# Patient Record
Sex: FEMALE | Race: WHITE
[De-identification: names, ages, dates, MRNs, and addresses within clinical notes are randomized per-mention and may not be internally consistent; named-entity substitution may affect disease eponyms.]

---

## 2018-05-31 ENCOUNTER — HOSPITAL ENCOUNTER (OUTPATIENT)
Dept: HOSPITAL 47 - FBPOP | Age: 28
Discharge: HOME | End: 2018-05-31
Payer: COMMERCIAL

## 2018-05-31 VITALS
HEART RATE: 78 BPM | RESPIRATION RATE: 16 BRPM | DIASTOLIC BLOOD PRESSURE: 65 MMHG | SYSTOLIC BLOOD PRESSURE: 114 MMHG | TEMPERATURE: 98.1 F

## 2018-05-31 DIAGNOSIS — Z3A.24: ICD-10-CM

## 2018-05-31 DIAGNOSIS — O47.02: Primary | ICD-10-CM

## 2018-05-31 PROCEDURE — 99213 OFFICE O/P EST LOW 20 MIN: CPT

## 2018-05-31 PROCEDURE — 82731 ASSAY OF FETAL FIBRONECTIN: CPT

## 2018-06-11 NOTE — P.MSEPDOC
Presenting Problems





- Arrival Data


Date of Arrival on Unit: 18


Time of Arrival on Unit: 21:03


Mode of Transport: Wheelchair





- Complaint


OB-Reason for Admission/Chief Complaint: Possible Onset of Labor


Comment: Patient states she has a history of at vaginal delivery at 27 weeks 

with her last baby, states she has been having marisabel chen contractions for a 

few weeks now, but that today she noticed they were increased in frequency, 

states she was nervous because of her last delivery so just wanted to come in 

for peace of mind.





Prenatal Medical History





- Pregnancy Information


: 2


Para: 1


Term: 0


: 1


Abortions: Spontaneous or Elective: 0


Number of Living Children: 1





- Gestational Age


Gestational Age by JOHN (wks/days): 24 Weeks and 3 Days





- Prenatal History


Pregnancy Complications: Prior 





Review of Systems





- Review of Systems


Constitutional: No problems


Breast: No problems


ENT: No problems


Cardiovascular: No problems


Respiratory: No problems


Gastrointestinal: No problems


Genitourinary: No problems


Musculoskeletal: No problems


Neurological: No problems


Skin: No problems





Vital Signs





- Temperature


Temperature: 98.1 F


Temperature Source: Temporal Artery Scan





- Pulse


  ** Pulse Oximetery


Pulse Rate: 78


Pulse Assessment Method: Pulse Oximetry





- Respirations


Respiratory Rate: 16


Oxygen Delivery Method: Room Air





- Blood Pressure


  ** Sitting


Blood Pressure: 114/65


Blood Pressure Mean: 81


Blood Pressure Source: Automatic Cuff





Medical Screen Scoring (Pre)





- Cervical Exam


Dilation: 0 cm = 0


Membranes: Intact





- Uterine Contractions


Frequency: > 5 minutes apart = 1


Duration: > 40 seconds = 2


Intensity: N/A





- Maternal Vital Signs


Maternal Temperature: N/A


Maternal Blood Pressure: N/A


Signs of Preeclampsia: N/A


Maternal Respirations: N/A





- Total Score


Total Score (Pre): 3





- Level of Risk


Level of Risk: Low (0-5)





Physician Notification (Pre)





- Physician Notified


Physician Notified Date: 18


Physician Notified Time: 21:15


Physician/Practitioner Notifed:: Dr. Meza


New Order Received: Yes





- Notification Comment


Comment: Collect and send FFN, and check cervix, if ffn is negative and patient 

is not marbin okay to discharge patient home with instructions, if ffn 

postive call physician with report.  Instruct patient to take a stool softener 

to help with increased constipation, but avoid a laxative.





Medical Screen Scoring (Post)





- Cervical Exam


Dilation: 0 cm = 0


Membranes: Intact





- Uterine Contractions


Frequency: < 36 weeks = 6


Duration: > 40 seconds = 2


Intensity: N/A





- Maternal Vital Signs


Maternal Temperature: N/A


Maternal Blood Pressure: N/A


Signs of Preeclampsia: N/A


Maternal Respirations: N/A





- Total Score


Total Score (Post): 8





- Post Treatment Level of Risk


Post Treatment Level of Risk: Medium (6-9)





Physician Notification (Post)





- Physician Notified


Physician Notified Date: 18


Physician Notified Time: 22:50


Physician/Practitioner Notified:: DR MEZA


New Order Received: Yes





Disposition





- Disposition


OB Disposition: Discharge to home, Written follow up instructions reviewed


Discharge Date: 18


Discharge Time: 23:00


I agree with the RN Medical Screening Exam: Yes


Risk & Benefit of care provided described in d/c instruction: Yes


Diagnosis: FALSE LABOR BEFORE 37 COMPLETED WEEKS OF GEST, UNSP TRI

## 2018-08-07 ENCOUNTER — HOSPITAL ENCOUNTER (OUTPATIENT)
Dept: HOSPITAL 47 - FBPOP | Age: 28
Discharge: NURSING, REHAB OR LONG-TERM CARE | End: 2018-08-07
Attending: OBSTETRICS & GYNECOLOGY
Payer: COMMERCIAL

## 2018-08-07 DIAGNOSIS — Z3A.34: ICD-10-CM

## 2018-08-07 DIAGNOSIS — M54.9: ICD-10-CM

## 2018-08-07 DIAGNOSIS — O99.89: Primary | ICD-10-CM

## 2018-08-07 LAB
BASOPHILS # BLD AUTO: 0 K/UL (ref 0–0.2)
BASOPHILS NFR BLD AUTO: 0 %
EOSINOPHIL # BLD AUTO: 0.2 K/UL (ref 0–0.7)
EOSINOPHIL NFR BLD AUTO: 2 %
ERYTHROCYTE [DISTWIDTH] IN BLOOD BY AUTOMATED COUNT: 3.6 M/UL (ref 3.8–5.4)
ERYTHROCYTE [DISTWIDTH] IN BLOOD: 12.3 % (ref 11.5–15.5)
HCT VFR BLD AUTO: 35.9 % (ref 34–46)
HGB BLD-MCNC: 11.9 GM/DL (ref 11.4–16)
LYMPHOCYTES # SPEC AUTO: 2 K/UL (ref 1–4.8)
LYMPHOCYTES NFR SPEC AUTO: 18 %
MCH RBC QN AUTO: 33.2 PG (ref 25–35)
MCHC RBC AUTO-ENTMCNC: 33.2 G/DL (ref 31–37)
MCV RBC AUTO: 99.9 FL (ref 80–100)
MONOCYTES # BLD AUTO: 0.6 K/UL (ref 0–1)
MONOCYTES NFR BLD AUTO: 5 %
NEUTROPHILS # BLD AUTO: 8.5 K/UL (ref 1.3–7.7)
NEUTROPHILS NFR BLD AUTO: 74 %
PLATELET # BLD AUTO: 316 K/UL (ref 150–450)
WBC # BLD AUTO: 11.5 K/UL (ref 3.8–10.6)

## 2018-08-07 PROCEDURE — 99215 OFFICE O/P EST HI 40 MIN: CPT

## 2018-08-07 PROCEDURE — 96367 TX/PROPH/DG ADDL SEQ IV INF: CPT

## 2018-08-07 PROCEDURE — 96361 HYDRATE IV INFUSION ADD-ON: CPT

## 2018-08-07 PROCEDURE — 59025 FETAL NON-STRESS TEST: CPT

## 2018-08-07 PROCEDURE — 96372 THER/PROPH/DIAG INJ SC/IM: CPT

## 2018-08-07 PROCEDURE — 96365 THER/PROPH/DIAG IV INF INIT: CPT

## 2018-08-07 PROCEDURE — 85025 COMPLETE CBC W/AUTO DIFF WBC: CPT

## 2018-08-07 NOTE — P.HPOB
History of Present Illness


H&P Date: 18


Chief Complaint: Back pain





This is a 29-year-old white female  2 para 0101 EDC 2018 at 34-2/7 

weeks' gestation.  Patient presents today with a complaint of back pain which 

started last night.  She denies fluid leakage or vaginal bleeding.  Fetus is 

been active throughout the pregnancy.  The back pain is mild to moderate in 

nature, she denies any obvious uterine contractions.





Past medical history is significant for attention deficit disorder and MTHFR,  

factor V Leiden.





Past surgical history none recorded.





Current medications Adderall XL 20 mg capsules daily, prenatal vitamin daily, 

17 hydroxyprogesterone injections weekly.





ALLERGIES none known.





Family history significant for factor V Leiden disorder and hypertension.





Reproductive history significant for 28 week vaginal delivery male infant, 2 

lbs. 14 oz., rapid delivery noted.





Social history patient is a long-term tobacco smoker, currently one half pack 

per day.  She is single, boyfriend and father of the baby is involved.  She 

denies alcohol or drug use.





Prenatal history is significant for blood type B+, rubella status immune.  

Patient had an abnormal Pap smear noted in pregnancy, high grade KEMAL.  

Colposcopy was performed along with colposcopically directed biopsies, repeat 

colposcopy in the third trimester was stable.  Chlamydia and gonorrhea cultures 

negative, HIV, hepatitis B surface antigen, urine culture all negative.





On exam this is a pleasant white female, she is 5 foot 7 inches, 150 pounds, 

vital signs are stable and she is afebrile.  The general physical exam is 

within normal limits.  The cervix is 4 cm dilated, very posterior, 70% effaced, 

-1 station, vertex presentation, intact.  Fetal heart rate is consistent with 

reactive NST.





Impression: 34-2/7 weeks intrauterine pregnancy,  labor, no cervical 

change noted over the course of 45 minutes.





Plan: Betamethasone has been given.  Magnesium sulfate 4 g loading dose is 

given.  I believe it is in the patient's best interest to transfer to Tertiary 

Care facility, [facility] contacted.  The case has been 

discussed with , maternal-fetal medicine specialist, who is 

accepting transfer.  Cerna catheter will be placed.  Penicillin G will be given 

prior to transfer.  She is aware of the risks and benefits of transfer to 

tertiary care center, including rapid delivery in the ambulance which I believe 

is highly unlikely.  Patient accepts the risks and transfer to [facility].





Review of Systems





Negative except as in HPI





Past Medical History


Past Medical History: No Reported History


Additional Past Medical History / Comment(s): MTHFR, ADD, HGSIL on pap smear 

and colposcopy.


History of Any Multi-Drug Resistant Organisms: None Reported


Past Surgical History: No Surgical Hx Reported


Past Anesthesia/Blood Transfusion Reactions: No Reported Reaction


Smoking Status: Never smoker





- Past Family History


  ** Mother


Family Medical History: No Reported History





Medications and Allergies


 Home Medications











 Medication  Instructions  Recorded  Confirmed  Type


 


Prenatal Vit-Iron-Folic Acid 1 cap PO DAILY 16 History





[Prenatal-U Capsule]    


 


Polyethylene Glycol 3350 [Miralax] 17 gm PO DAILY 18 History


 


Progesterone, Micronized 1 mg IM WEEKLY 18 History





[Progesterone]    


 


Dextroamphetamine/Amphetamine 10 mg PO DAILY 18 History





[Adderall]    











 Allergies











Allergy/AdvReac Type Severity Reaction Status Date / Time


 


No Known Allergies Allergy   Verified 18 12:00














Exam


 Intake and Output











 18





 22:59 06:59 14:59


 


Other:   


 


  Weight   64.41 kg














See dictation under HPI please





Assessment and Plan


Assessment: 





Transfer to [facility], under the care of maternal 

fetal medicine specialist .  All risks and benefits of transfer of 

care are discussed with the patient.  I believe she is stable for transfer at 

this time.  Betamethasone has been given.  Magnesium sulfate will continue to 

run at 2 g per hour, penicillin G 5,000,000 units.


Time with Patient: Greater than 30 (34-2/7 weeks intrauterine pregnancy, 

 labor, stable for transfer.)

## 2019-01-07 NOTE — HP
HISTORY AND PHYSICAL



REASON FOR ADMISSION:

Surgery scheduled 01/08/2019



HISTORY OF PRESENT ILLNESS:

This is a 29-year-old female who presents with a history of abnormal Pap smear.

Cervical biopsies at 8 o'clock, 10 o'clock, and 12 o'clock along with endocervical

curettage are all positive for high-grade squamous intraepithelial lesion.  Patient's

 is planning a vasectomy, no further childbearing is anticipated.  After

thorough consultation, we have elected to proceed with cold knife conization and

endocervical curettage.  All questions have been answered, all risks and benefits

thoroughly reviewed.



REVIEW OF SYMPTOMS:

Review of systems is otherwise negative.



PAST MEDICAL HISTORY:

Past medical history is significant for attention deficit disorder, and positive for

MTHFR.



PAST SURGICAL HISTORY:

Past surgical history is significant for colposcopy.



MEDICATIONS:

Current medications Adderall 10 mg daily, Adderall XL 20 mg tablet daily, and vitamin

daily.



ALLERGIES:

Allergies none known.



FAMILY HISTORY:

Family history is significant for Factor 5 Leiden deficiency, clotting disorder, and

hypertension.



REPRODUCTIVE HISTORY:

Is significant for vaginal deliveries in 2016, in 2018.



SOCIAL HISTORY:

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.  She is not , boyfriend is

active in her life.  She is previously a 1/2 pack per day tobacco smoker but decrease

tobacco use to approximately 3 cigarettes daily.  She denies alcohol or drug use.



PHYSICAL EXAMINATION:

On exam, this is a pleasant white female, she is 139 pounds, blood pressure is 100/68.

HEENT exam reveals no thyromegaly, no cervical lymphadenopathy, normal range of motion

of the neck.  Breasts are bilaterally symmetric to inspection with no skin dimpling,

nipple discharge, axillary adenopathy or discernible lesions or masses.  Chest is clear

to auscultation in all fields anteriorly and posteriorly.  Cardiac exam reveals regular

rate and rhythm with no murmur, click, or rub.  Abdomen is soft, nontender, no

hepatosplenomegaly.  Active bowel sounds.

On pelvic examination, external genitalia is age-appropriate with no unusual discharge

or odor.  The cervix is multiparous in appearance and irregular to inspection.  Uterus

is small, mobile, anteverted, anteflexed, adnexa are negative bilaterally.



IMPRESSION:

High-grade squamous intraepithelial lesion noted on multiple cervical biopsies, along

with positive endocervical curettage for high-grade KEMAL.  Patient requesting cold knife

conization.



PLAN:

We will proceed with cold knife conization at [facility].  She is aware of the

risks of cervical incompetence, infection, bleeding, perforation or damage to bladder

or bowels.  Again, her partner is planning vasectomy and no further child bearing is

anticipated.  Second opinion has been offered and declined.  Risks of anesthesia to

include aspiration, nerve damage, and even death have all been reviewed.





MMODL / IJN: [id number] / Job#: [id number]

## 2019-01-08 ENCOUNTER — HOSPITAL ENCOUNTER (OUTPATIENT)
Dept: HOSPITAL 47 - OR | Age: 29
Discharge: HOME | End: 2019-01-08
Attending: OBSTETRICS & GYNECOLOGY
Payer: COMMERCIAL

## 2019-01-08 VITALS — TEMPERATURE: 97.7 F

## 2019-01-08 VITALS — HEART RATE: 54 BPM | SYSTOLIC BLOOD PRESSURE: 119 MMHG | DIASTOLIC BLOOD PRESSURE: 67 MMHG

## 2019-01-08 VITALS — RESPIRATION RATE: 16 BRPM

## 2019-01-08 VITALS — BODY MASS INDEX: 20.3 KG/M2

## 2019-01-08 DIAGNOSIS — Z79.899: ICD-10-CM

## 2019-01-08 DIAGNOSIS — F98.8: ICD-10-CM

## 2019-01-08 DIAGNOSIS — F17.210: ICD-10-CM

## 2019-01-08 DIAGNOSIS — E72.12: ICD-10-CM

## 2019-01-08 DIAGNOSIS — D06.0: Primary | ICD-10-CM

## 2019-01-08 PROCEDURE — 88305 TISSUE EXAM BY PATHOLOGIST: CPT

## 2019-01-08 PROCEDURE — 81025 URINE PREGNANCY TEST: CPT

## 2019-01-08 PROCEDURE — 57520 CONIZATION OF CERVIX: CPT

## 2019-01-08 PROCEDURE — 88307 TISSUE EXAM BY PATHOLOGIST: CPT

## 2019-01-08 NOTE — P.OP
Date of Procedure: 01/08/19


Preoperative Diagnosis: 


High-grade squamous intraepithelial lesion on the cervix, multiple sites, 

positive ECC


Postoperative Diagnosis: 


Pathology pending


Procedure(s) Performed: 


Cold knife conization, endocervical curettage


Anesthesia: RICHIE


Surgeon: Sintia Hendricks


Estimated Blood Loss (ml): 20


IV fluids (ml): 500


Urine output (ml): 150


Pathology: other (Cervix tagged at 6:00, endocervical curettings.)


Condition: stable


Disposition: PACU


Description of Procedure: 


Patient is brought to the operating suite where a general anesthetic is 

administered.  She's placed in the dorsal lithotomy position.  Urine hCG is 

negative.  The appropriate timeout is performed to assure proper patient and 

procedural identification.  The bladder is drained for 150 mL of clear yellow 

urine.  Weighted speculum was placed into the vagina.  Anterior lip of the 

cervix is grasped with a double-tooth tenaculum.  The cervix is injected 

circumferentially with a dilute Pitressin solution, 10 mL total being used.  0 

Vicryl sutures then used on either side of the cervix, from 2:00 to 4:00 tied 

and held, from 8:00 to 10:00, tied and held laterally.





Lugol's solution is used on the surface of the cervix.  A scalpel is used to 

incise the conization specimen, circumferentially, it is removed and suture 

tied at 6:00 and sent to pathology.  Endocervical curettage is then performed 

on remaining endocervix.  Electrocautery is used to cauterize the entire base 

of the cervix remaining.  Monsel solution is used.  Stay stitches are removed.  

Hemostasis is excellent.





Patient is brought back to recovery room in stable condition with a blood 

pressure 90/40, pulse 48.  All sponge needle and enhancement counts are 

correct.  She is given Toradol prior to leaving the operative suite.  She will 

follow-up with me in the office in 2 weeks.